# Patient Record
Sex: FEMALE | Race: WHITE
[De-identification: names, ages, dates, MRNs, and addresses within clinical notes are randomized per-mention and may not be internally consistent; named-entity substitution may affect disease eponyms.]

---

## 2020-08-24 ENCOUNTER — HOSPITAL ENCOUNTER (OUTPATIENT)
Dept: HOSPITAL 18 - NAV RAD | Age: 65
Discharge: HOME | End: 2020-08-24
Payer: COMMERCIAL

## 2020-08-24 DIAGNOSIS — M17.11: ICD-10-CM

## 2020-08-24 DIAGNOSIS — R73.03: Primary | ICD-10-CM

## 2020-08-24 NOTE — RAD
RADIOGRAPH RIGHT KNEE 4 VIEWS:

 

Date:  08/24/2020

 

HISTORY:  

64-year-old female with right knee pain. 

 

FINDINGS:

Little or no joint effusion. 

 

Patellofemoral compartment:  High grade joint space narrowing and moderate osteophytosis. 

 

Lateral compartment:  Mild joint space narrowing with moderate osteophytosis. 

 

Medial compartment:  Severe joint space narrowing with sclerosis and moderate osteophytosis. 

 

This results in mild varus angulation. 

 

IMPRESSION: 

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
tellofemoral compartment, with mild genu varus. 

 

 

POS: [facility]

## 2023-01-13 ENCOUNTER — HOSPITAL ENCOUNTER (OUTPATIENT)
Dept: HOSPITAL 92 - CSHMAMMO | Age: 68
Discharge: HOME | End: 2023-01-13
Attending: FAMILY MEDICINE
Payer: MEDICARE

## 2023-01-13 DIAGNOSIS — Z80.3: ICD-10-CM

## 2023-01-13 DIAGNOSIS — Z12.31: Primary | ICD-10-CM

## 2023-01-13 PROCEDURE — 77067 SCR MAMMO BI INCL CAD: CPT

## 2023-01-13 PROCEDURE — 77063 BREAST TOMOSYNTHESIS BI: CPT
